# Patient Record
Sex: MALE
[De-identification: names, ages, dates, MRNs, and addresses within clinical notes are randomized per-mention and may not be internally consistent; named-entity substitution may affect disease eponyms.]

---

## 2017-08-29 ENCOUNTER — CHARTING TRANS (OUTPATIENT)
Dept: OTHER | Age: 47
End: 2017-08-29

## 2017-09-11 NOTE — LETTER
04/25/19        Collin Aguero  [facility]  [place] [address]      Dear Zay Feliz,    [facility] records indicate that you have outstanding lab work and or testing that was ordered for you and has not yet been completed:  Lab Frequency Next Occur Hpi Title: Evaluation of Skin Lesions How Severe Are Your Spot(S)?: mild Have Your Spot(S) Been Treated In The Past?: has not been treated

## 2019-03-26 ENCOUNTER — TELEPHONE (OUTPATIENT)
Dept: FAMILY MEDICINE CLINIC | Facility: CLINIC | Age: 49
End: 2019-03-26

## 2019-03-26 DIAGNOSIS — Z12.11 SCREENING FOR MALIGNANT NEOPLASM OF COLON: Primary | ICD-10-CM

## 2019-04-25 ENCOUNTER — TELEPHONE (OUTPATIENT)
Dept: FAMILY MEDICINE CLINIC | Facility: CLINIC | Age: 49
End: 2019-04-25

## 2021-04-30 ENCOUNTER — OFFICE VISIT (OUTPATIENT)
Dept: FAMILY MEDICINE CLINIC | Facility: CLINIC | Age: 51
End: 2021-04-30

## 2021-04-30 ENCOUNTER — TELEPHONE (OUTPATIENT)
Dept: ORTHOPEDICS CLINIC | Facility: CLINIC | Age: 51
End: 2021-04-30

## 2021-04-30 ENCOUNTER — TELEPHONE (OUTPATIENT)
Dept: FAMILY MEDICINE CLINIC | Facility: CLINIC | Age: 51
End: 2021-04-30

## 2021-04-30 ENCOUNTER — HOSPITAL ENCOUNTER (OUTPATIENT)
Dept: GENERAL RADIOLOGY | Age: 51
Discharge: HOME | End: 2021-04-30
Attending: FAMILY MEDICINE
Payer: OTHER MISCELLANEOUS

## 2021-04-30 ENCOUNTER — HOSPITAL ENCOUNTER (OUTPATIENT)
Age: 51
Discharge: HOME | End: 2021-04-30
Payer: OTHER MISCELLANEOUS

## 2021-04-30 VITALS
SYSTOLIC BLOOD PRESSURE: 140 MMHG | HEART RATE: 66 BPM | DIASTOLIC BLOOD PRESSURE: 100 MMHG | OXYGEN SATURATION: 99 % | TEMPERATURE: 99 F | RESPIRATION RATE: 16 BRPM

## 2021-04-30 VITALS
RESPIRATION RATE: 16 BRPM | WEIGHT: 210 LBS | DIASTOLIC BLOOD PRESSURE: 82 MMHG | SYSTOLIC BLOOD PRESSURE: 132 MMHG | BODY MASS INDEX: 30.75 KG/M2 | OXYGEN SATURATION: 99 % | TEMPERATURE: 98 F | HEART RATE: 77 BPM | HEIGHT: 69.25 IN

## 2021-04-30 DIAGNOSIS — S99.911A INJURY OF RIGHT ANKLE, INITIAL ENCOUNTER: ICD-10-CM

## 2021-04-30 DIAGNOSIS — M25.471 ANKLE SWELLING, RIGHT: ICD-10-CM

## 2021-04-30 DIAGNOSIS — S82.301A CLOSED FRACTURE OF DISTAL END OF RIGHT TIBIA, UNSPECIFIED FRACTURE MORPHOLOGY, INITIAL ENCOUNTER: Primary | ICD-10-CM
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
## 2021-04-30 DIAGNOSIS — S82.391A OTHER CLOSED FRACTURE OF DISTAL END OF RIGHT TIBIA, INITIAL ENCOUNTER: Primary | ICD-10-CM

## 2021-04-30 PROCEDURE — 3077F SYST BP >= 140 MM HG: CPT | Performed by: NURSE PRACTITIONER

## 2021-04-30 PROCEDURE — 3008F BODY MASS INDEX DOCD: CPT | Performed by: FAMILY MEDICINE

## 2021-04-30 PROCEDURE — 73610 X-RAY EXAM OF ANKLE: CPT | Performed by: FAMILY MEDICINE

## 2021-04-30 PROCEDURE — 3079F DIAST BP 80-89 MM HG: CPT | Performed by: FAMILY MEDICINE

## 2021-04-30 PROCEDURE — 29515 APPLICATION SHORT LEG SPLINT: CPT | Performed by: NURSE PRACTITIONER

## 2021-04-30 PROCEDURE — 99203 OFFICE O/P NEW LOW 30 MIN: CPT | Performed by: NURSE PRACTITIONER

## 2021-04-30 PROCEDURE — 99214 OFFICE O/P EST MOD 30 MIN: CPT | Performed by: FAMILY MEDICINE

## 2021-04-30 PROCEDURE — 3080F DIAST BP >= 90 MM HG: CPT | Performed by: NURSE PRACTITIONER

## 2021-04-30 PROCEDURE — 3075F SYST BP GE 130 - 139MM HG: CPT | Performed by: FAMILY MEDICINE

## 2021-04-30 NOTE — ED PROVIDER NOTES
Patient Seen in: Immediate [address] [place] [address]      History   Patient presents with:  Leg or Foot Injury    Stated Complaint: RIGHT ANKLE INJURY/WORKERS COMP    HPI/Subjective:   57-year-old male presents to the IC from primary care physician's office to get a s lesions  HEENT: atraumatic, normocephalic,ears and throat are clear  NECK: supple,no adenopathy,no bruits  LUNGS: clear to auscultation  CARDIO: RRR without murmur    EXTREMITIES:   R  ankle exam:   - defect/deformity : no   - swelling/effusion: yes   - te also given written discharge instructions including information regarding today's visit and indications prompting immediate return and appropriate follow-up was given in writing.   Patient and/or family agrees to return for any concerns, problems or complic

## 2021-04-30 NOTE — TELEPHONE ENCOUNTER
After confirming with ortho, regarding provider availability to see Mr Alia Cortes for his tibial plafond fracture, please call patient / spouse and confirm appt for May 10 with Dr Blake Pang. ~ MM

## 2021-04-30 NOTE — TELEPHONE ENCOUNTER
Spouse called, Pt referred to Dr. Hermelinda Polo today by Dr. Shreya Morales and Dr. Hermelinda Polo will not see pt as he does not specialize in what pt needs and  cannot see pt until 5/10. Is that too log for pt to wait and be seen?   Please call spouse at [phone]

## 2021-04-30 NOTE — PATIENT INSTRUCTIONS
XR - results - awaiting final results at this time.       Ace wrap and ankle immobilizer for temporary immobilization   CRUTCHES PROVIDED WHILE HERE IN THE OFFICE   Will await the XR results and discuss the need for an MRI if needed to rule out the possib use acetaminophen or Ibuprofen to control the pain. Talk with your provider before taking these medicines if you have chronic liver or kidney disease. Also talk with your provider if you have had an ulcer or gastrointestinal bleeding.   General care  · Rest occur:  · A plaster splint or cast gets wet or soft or breaks  · A fiberglass splint or cast gets wet and does not dry in 24 hours  · Pain or swelling gets worse, or redness appears  · Toes or the injured area become cold, blue, numb or tingly  · You can't

## 2021-04-30 NOTE — TELEPHONE ENCOUNTER
Patient's wife, Saadia Lopez, advised of Doctor's note below. She verbalized understanding. No further questions at this time.     Future Appointments   Date Time Provider Gorge Burnette   5/10/2021  8:20 AM Mesha Andres MD [facility]

## 2021-04-30 NOTE — PROGRESS NOTES
Here for right ankle injury x this am. Patient states he was at work when he stepped in a ditch and ankle extended all the way and he heard a pop. Workmans comp claim. Pain/swelling. No bruising.

## 2021-04-30 NOTE — PROGRESS NOTES
[facility] Family [facility] Office Note  Chief Complaint:   Patient presents with: Ankle Pain: right ankle pain.       HPI:   This is a 44year old male coming in for right ankle injury x this am - 8:30 am. States that he stepped in a ditch and an appropriately   SKIN: No pallor, no erythema, no cyanosis, warm and dry  Eyes: wnl, normal conjunctiva   HEAD: Normocephalic, atraumatic  EENT: OP - wnl, moist, Nares normal  NECK: FROM, supple  LUNGS: No tachypnea   CV: No tachycardia   ABD: not distended hairline, nondisplaced fracture of the distal tibia extending to the tibial talar joint involving the medial tibial plafond.    Dictated by (CST): Geovanni Pires MD on 4/30/2021 at 1:11 PM     Finalized by (CST): Geovanni Pires MD on 4/30/2021 at 1: learning. Medical education done. Outcome: Patient verbalizes understanding. Patient is notified to call with any questions, complications, allergies, or worsening or changing symptoms.   Patient is to call with any side effects or complications from the

## 2021-04-30 NOTE — ED INITIAL ASSESSMENT (HPI)
Pt sts 8:30am today caught right foot on a string line at work. Had x/r at other [facility] location- fx seen, sent here for splint.

## 2021-04-30 NOTE — TELEPHONE ENCOUNTER
Patient has an appointment scheduled with Dr. Christian Johnson on 5/10 for right ankle tib fib. Patient has had imaging, will additional imaging be needed?

## 2021-05-03 ENCOUNTER — TELEPHONE (OUTPATIENT)
Dept: FAMILY MEDICINE CLINIC | Facility: CLINIC | Age: 51
End: 2021-05-03

## 2021-05-03 NOTE — TELEPHONE ENCOUNTER
----- Message from Shanna Horvath MD sent at 5/3/2021  9:05 AM CDT -----  Regarding: FW: Will need your expertise soon    ----- Message -----  From: Aravind Hunt MD  Sent: 5/3/2021   7:59 AM CDT  To: Sonia Ball DO, #  Subject: RE: Will ne

## 2021-05-03 NOTE — TELEPHONE ENCOUNTER
Allyson @ PCP office calling stating Dr Jolyn Osler agreed to see this pt instead of Dr Kwadwo Amaya, please advise if this appt should be rescheduled for pt to see Dr Jolyn Osler

## 2021-05-03 NOTE — TELEPHONE ENCOUNTER
Future Appointments   Date Time Provider Gorge Yomaira   5/5/2021 10:40 AM Hazel Metzger MD [facility]

## 2021-05-03 NOTE — TELEPHONE ENCOUNTER
Called Dr. Juice Berger office to confirm pt's appointment change. Staff stated no messages from Dr. Wily Bennett about pt's appointment. Stated she will double check w/ Dr. Wily Bennett and will contact patient for appointment change.     Will route to nurse pool f

## 2021-05-05 ENCOUNTER — OFFICE VISIT (OUTPATIENT)
Dept: ORTHOPEDICS CLINIC | Facility: CLINIC | Age: 51
End: 2021-05-05
Payer: OTHER MISCELLANEOUS

## 2021-05-05 ENCOUNTER — HOSPITAL ENCOUNTER (OUTPATIENT)
Dept: GENERAL RADIOLOGY | Age: 51
Discharge: HOME | End: 2021-05-05
Attending: ORTHOPAEDIC SURGERY
Payer: OTHER MISCELLANEOUS

## 2021-05-05 VITALS — HEART RATE: 75 BPM | OXYGEN SATURATION: 99 %

## 2021-05-05 DIAGNOSIS — S82.891A CLOSED FRACTURE OF RIGHT ANKLE, INITIAL ENCOUNTER: Primary | ICD-10-CM

## 2021-05-05 DIAGNOSIS — S82.891A CLOSED FRACTURE OF RIGHT ANKLE, INITIAL ENCOUNTER: ICD-10-CM

## 2021-05-05 PROCEDURE — 27760 CLTX MEDIAL ANKLE FX: CPT | Performed by: ORTHOPAEDIC SURGERY

## 2021-05-05 PROCEDURE — L4387 NON-PNEUM WALK BOOT PRE OTS: HCPCS | Performed by: ORTHOPAEDIC SURGERY

## 2021-05-05 PROCEDURE — 73590 X-RAY EXAM OF LOWER LEG: CPT | Performed by: ORTHOPAEDIC SURGERY

## 2021-05-05 PROCEDURE — 99203 OFFICE O/P NEW LOW 30 MIN: CPT | Performed by: ORTHOPAEDIC SURGERY

## 2021-05-05 NOTE — H&P
[facility] New Patient Note    CC: Patient presents with:  Fracture: right tib/fib fracture       HPI: This 44year old male presents today with complaints of right ankle injury.   Patient reports that he was walking 4 days ago, is not sure how he cam skin intact under splint, no open wounds or lesions  • Palpation: Tender to palpation near medial malleolus, nontender lateral malleolus, nontender at the fibula and proximal fibula in the leg, nontender midfoot joint, nontender over the forefoot and midfo that time. He expressed understanding and agreement with this discussion and plan.     Marlen Florian MD, [address] Orthopedic Surgery  Phone [phone]  Fax [phone]

## 2021-05-12 ENCOUNTER — HOSPITAL ENCOUNTER (OUTPATIENT)
Dept: GENERAL RADIOLOGY | Age: 51
Discharge: HOME | End: 2021-05-12
Attending: ORTHOPAEDIC SURGERY
Payer: OTHER MISCELLANEOUS

## 2021-05-12 DIAGNOSIS — S82.891A CLOSED FRACTURE OF RIGHT ANKLE, INITIAL ENCOUNTER: ICD-10-CM

## 2021-05-12 PROCEDURE — 73610 X-RAY EXAM OF ANKLE: CPT | Performed by: ORTHOPAEDIC SURGERY

## 2021-06-02 ENCOUNTER — HOSPITAL ENCOUNTER (OUTPATIENT)
Dept: GENERAL RADIOLOGY | Age: 51
Discharge: HOME | End: 2021-06-02
Attending: ORTHOPAEDIC SURGERY
Payer: OTHER MISCELLANEOUS

## 2021-06-02 ENCOUNTER — OFFICE VISIT (OUTPATIENT)
Dept: ORTHOPEDICS CLINIC | Facility: CLINIC | Age: 51
End: 2021-06-02
Payer: COMMERCIAL

## 2021-06-02 VITALS — HEART RATE: 73 BPM | OXYGEN SATURATION: 99 %

## 2021-06-02 DIAGNOSIS — S82.891D CLOSED FRACTURE OF RIGHT ANKLE WITH ROUTINE HEALING, SUBSEQUENT ENCOUNTER: Primary | ICD-10-CM

## 2021-06-02 DIAGNOSIS — S82.891A CLOSED FRACTURE OF RIGHT ANKLE, INITIAL ENCOUNTER: ICD-10-CM

## 2021-06-02 PROCEDURE — 73610 X-RAY EXAM OF ANKLE: CPT | Performed by: ORTHOPAEDIC SURGERY

## 2021-06-02 PROCEDURE — 99024 POSTOP FOLLOW-UP VISIT: CPT | Performed by: ORTHOPAEDIC SURGERY

## 2021-06-17 ENCOUNTER — TELEPHONE (OUTPATIENT)
Dept: ORTHOPEDICS CLINIC | Facility: CLINIC | Age: 51
End: 2021-06-17

## 2021-06-17 NOTE — TELEPHONE ENCOUNTER
Left detailed voicemail informing patient of letter completion. Advised patient to call us back if he would like letter faxed or  in office.

## 2021-06-17 NOTE — TELEPHONE ENCOUNTER
Letter pended for approval   Date to return to work 6/21/21.  Patient would like a note to return to work stating that patient can only walk and work on flat surfaces  Future Appointments   Date Time Provider Gorge Burnette   7/2/2021  8:00 AM Jeremias Jones,

## 2021-07-02 ENCOUNTER — HOSPITAL ENCOUNTER (OUTPATIENT)
Dept: GENERAL RADIOLOGY | Age: 51
Discharge: HOME | End: 2021-07-02
Attending: ORTHOPAEDIC SURGERY
Payer: OTHER MISCELLANEOUS

## 2021-07-02 ENCOUNTER — OFFICE VISIT (OUTPATIENT)
Dept: ORTHOPEDICS CLINIC | Facility: CLINIC | Age: 51
End: 2021-07-02
Payer: OTHER MISCELLANEOUS

## 2021-07-02 VITALS — HEART RATE: 65 BPM | OXYGEN SATURATION: 99 %

## 2021-07-02 DIAGNOSIS — S82.891D CLOSED FRACTURE OF RIGHT ANKLE WITH ROUTINE HEALING, SUBSEQUENT ENCOUNTER: ICD-10-CM

## 2021-07-02 DIAGNOSIS — S82.891D CLOSED FRACTURE OF RIGHT ANKLE WITH ROUTINE HEALING, SUBSEQUENT ENCOUNTER: Primary | ICD-10-CM

## 2021-07-02 PROCEDURE — 73610 X-RAY EXAM OF ANKLE: CPT | Performed by: ORTHOPAEDIC SURGERY

## 2021-07-02 PROCEDURE — 99024 POSTOP FOLLOW-UP VISIT: CPT | Performed by: ORTHOPAEDIC SURGERY

## 2021-07-02 NOTE — PROGRESS NOTES
[facility] Progress Note    Subjective: Patient returns 2 months after sustaining a right ankle medial malleolus fracture. He has been out of the walking boot since last visit.   He states that he has some soreness on the inside of his ankle like a b and a half for repeat evaluation, no x-rays needed.     Colleen Rudd MD, [address] [facility]  Phone [phone]  Fax [phone]

## 2021-08-09 ENCOUNTER — PATIENT MESSAGE (OUTPATIENT)
Dept: ORTHOPEDICS CLINIC | Facility: CLINIC | Age: 51
End: 2021-08-09

## 2021-08-09 NOTE — TELEPHONE ENCOUNTER
From: Cathryn Pelayo  To: Dewayne Mosley MD  Sent: 8/9/2021 10:57 AM CDT  Subject: Visit Follow-up Question    I am requesting a full back to work letter for my file and for my employer. My last letter for back to work had restrictions.  Can I have

## 2022-05-03 ENCOUNTER — HOSPITAL ENCOUNTER (OUTPATIENT)
Dept: CT IMAGING | Age: 52
Discharge: HOME | End: 2022-05-03
Attending: FAMILY MEDICINE

## 2022-05-03 DIAGNOSIS — Z13.9 SPECIAL SCREENING: ICD-10-CM

## 2022-05-06 ENCOUNTER — TELEPHONE (OUTPATIENT)
Dept: FAMILY MEDICINE CLINIC | Facility: CLINIC | Age: 52
End: 2022-05-06

## 2022-05-06 NOTE — TELEPHONE ENCOUNTER
----- Message from Gautam Sanchez DO sent at 5/6/2022  6:28 AM CDT -----  Please notify MrKole Dodson that his heart scan score came back positive for calcification. , it wasn;t a high score but the calcifications were in the arteries that feed the left side of the heart. It looks like its been a LONG time since rm seen him? I'd like him to come in and get caught up on his health amintenance, labs etc, and talk about treating his results.  He should be taking a baby ASA 81 mg a day to start and we can talk about statin therapy

## 2022-05-11 ENCOUNTER — OFFICE VISIT (OUTPATIENT)
Dept: FAMILY MEDICINE CLINIC | Facility: CLINIC | Age: 52
End: 2022-05-11
Payer: COMMERCIAL

## 2022-05-11 VITALS
RESPIRATION RATE: 22 BRPM | BODY MASS INDEX: 32.11 KG/M2 | WEIGHT: 216.81 LBS | SYSTOLIC BLOOD PRESSURE: 134 MMHG | HEIGHT: 69 IN | DIASTOLIC BLOOD PRESSURE: 82 MMHG | HEART RATE: 92 BPM | TEMPERATURE: 99 F

## 2022-05-11 DIAGNOSIS — I77.810 DILATED AORTIC ROOT (HCC): ICD-10-CM

## 2022-05-11 DIAGNOSIS — Z00.00 ROUTINE HEALTH MAINTENANCE: Primary | ICD-10-CM

## 2022-05-11 DIAGNOSIS — I25.10 CORONARY ARTERY CALCIFICATION: ICD-10-CM

## 2022-05-11 DIAGNOSIS — Z82.49 FAMILY HISTORY OF CORONARY ARTERY DISEASE IN GRANDFATHER: ICD-10-CM

## 2022-05-11 DIAGNOSIS — I25.84 CORONARY ARTERY CALCIFICATION: ICD-10-CM

## 2022-05-11 PROCEDURE — 3079F DIAST BP 80-89 MM HG: CPT | Performed by: FAMILY MEDICINE

## 2022-05-11 PROCEDURE — 3075F SYST BP GE 130 - 139MM HG: CPT | Performed by: FAMILY MEDICINE

## 2022-05-11 PROCEDURE — 3008F BODY MASS INDEX DOCD: CPT | Performed by: FAMILY MEDICINE

## 2022-05-11 PROCEDURE — 99215 OFFICE O/P EST HI 40 MIN: CPT | Performed by: FAMILY MEDICINE

## 2022-05-11 NOTE — PROGRESS NOTES
RN had pt sign paperwork for Cologuard in office    Pt wife advised she had previously done cologuard with same insurance plan and it was covered.

## 2022-05-14 ENCOUNTER — NURSE ONLY (OUTPATIENT)
Dept: FAMILY MEDICINE CLINIC | Facility: CLINIC | Age: 52
End: 2022-05-14
Payer: COMMERCIAL

## 2022-05-14 DIAGNOSIS — Z00.00 ROUTINE HEALTH MAINTENANCE: ICD-10-CM

## 2022-05-14 LAB
ALBUMIN SERPL-MCNC: 4 G/DL (ref 3.4–5)
ALBUMIN/GLOB SERPL: 1.3 {RATIO} (ref 1–2)
ALP LIVER SERPL-CCNC: 54 U/L
ALT SERPL-CCNC: 26 U/L
ANION GAP SERPL CALC-SCNC: 3 MMOL/L (ref 0–18)
AST SERPL-CCNC: 21 U/L (ref 15–37)
BASOPHILS # BLD AUTO: 0.02 X10(3) UL (ref 0–0.2)
BASOPHILS NFR BLD AUTO: 0.4 %
BILIRUB SERPL-MCNC: 0.9 MG/DL (ref 0.1–2)
BUN BLD-MCNC: 22 MG/DL (ref 7–18)
CALCIUM BLD-MCNC: 8.9 MG/DL (ref 8.5–10.1)
CHLORIDE SERPL-SCNC: 106 MMOL/L (ref 98–112)
CHOLEST SERPL-MCNC: 196 MG/DL (ref ?–200)
CO2 SERPL-SCNC: 28 MMOL/L (ref 21–32)
COMPLEXED PSA SERPL-MCNC: 1.78 NG/ML (ref ?–4)
CREAT BLD-MCNC: 1.1 MG/DL
EOSINOPHIL # BLD AUTO: 0.06 X10(3) UL (ref 0–0.7)
EOSINOPHIL NFR BLD AUTO: 1.3 %
ERYTHROCYTE [DISTWIDTH] IN BLOOD BY AUTOMATED COUNT: 12.6 %
FASTING PATIENT LIPID ANSWER: YES
FASTING STATUS PATIENT QL REPORTED: YES
GLOBULIN PLAS-MCNC: 3.1 G/DL (ref 2.8–4.4)
GLUCOSE BLD-MCNC: 90 MG/DL (ref 70–99)
HCT VFR BLD AUTO: 42.5 %
HDLC SERPL-MCNC: 56 MG/DL (ref 40–59)
HGB BLD-MCNC: 14.6 G/DL
IMM GRANULOCYTES # BLD AUTO: 0.01 X10(3) UL (ref 0–1)
IMM GRANULOCYTES NFR BLD: 0.2 %
LDLC SERPL CALC-MCNC: 131 MG/DL (ref ?–100)
LYMPHOCYTES # BLD AUTO: 1.37 X10(3) UL (ref 1–4)
LYMPHOCYTES NFR BLD AUTO: 30.4 %
MCH RBC QN AUTO: 31.8 PG (ref 26–34)
MCHC RBC AUTO-ENTMCNC: 34.4 G/DL (ref 31–37)
MCV RBC AUTO: 92.6 FL
MONOCYTES # BLD AUTO: 0.46 X10(3) UL (ref 0.1–1)
MONOCYTES NFR BLD AUTO: 10.2 %
NEUTROPHILS # BLD AUTO: 2.58 X10 (3) UL (ref 1.5–7.7)
NEUTROPHILS # BLD AUTO: 2.58 X10(3) UL (ref 1.5–7.7)
NEUTROPHILS NFR BLD AUTO: 57.5 %
NONHDLC SERPL-MCNC: 140 MG/DL (ref ?–130)
OSMOLALITY SERPL CALC.SUM OF ELEC: 287 MOSM/KG (ref 275–295)
PLATELET # BLD AUTO: 246 10(3)UL (ref 150–450)
POTASSIUM SERPL-SCNC: 4.4 MMOL/L (ref 3.5–5.1)
PROT SERPL-MCNC: 7.1 G/DL (ref 6.4–8.2)
RBC # BLD AUTO: 4.59 X10(6)UL
SODIUM SERPL-SCNC: 137 MMOL/L (ref 136–145)
TRIGL SERPL-MCNC: 50 MG/DL (ref 30–149)
TSI SER-ACNC: 1.3 MIU/ML (ref 0.36–3.74)
VLDLC SERPL CALC-MCNC: 9 MG/DL (ref 0–30)
WBC # BLD AUTO: 4.5 X10(3) UL (ref 4–11)

## 2022-05-14 PROCEDURE — 84153 ASSAY OF PSA TOTAL: CPT | Performed by: FAMILY MEDICINE

## 2022-05-14 PROCEDURE — 80050 GENERAL HEALTH PANEL: CPT | Performed by: FAMILY MEDICINE

## 2022-05-14 PROCEDURE — 80061 LIPID PANEL: CPT | Performed by: FAMILY MEDICINE

## 2022-05-14 NOTE — PROGRESS NOTES
Pt was in office for labs per DS    1 gold and 1 lavender tube collected from [facility] using straight needle and 1 attempt    Pt tolerated and was sent home in stable condition    RN also did eduction with wife in proper technique to check BP at home    In office lesson included teach back- all questions were answered.

## 2022-05-17 ENCOUNTER — TELEPHONE (OUTPATIENT)
Dept: FAMILY MEDICINE CLINIC | Facility: CLINIC | Age: 52
End: 2022-05-17

## 2022-05-17 DIAGNOSIS — I77.810 DILATED AORTIC ROOT (HCC): ICD-10-CM

## 2022-05-17 DIAGNOSIS — I25.10 CORONARY ARTERY CALCIFICATION: Primary | ICD-10-CM

## 2022-05-17 DIAGNOSIS — I25.84 CORONARY ARTERY CALCIFICATION: Primary | ICD-10-CM

## 2022-05-17 NOTE — TELEPHONE ENCOUNTER
----- Message from Quoc Sapp DO sent at 5/17/2022  2:48 PM CDT -----  Notify David Rosenthal  that overall his  labs looked very good, he had Excellent kidney, liver function, blood sugar and thyroid function, his prostate test came back normal as well. His cholesterol looked good, but with recent finding of coronary calcification and possibly HTN, his gaol is now 170 for total (his was 196), and 70 or less for his LDL, bad cholesterol. We discussed statin therapy which he does not want to do. And we need to follow up on his BP to see if he needs something for that. Finally I placed the order for the CTA for his dilated aorta.  Lets recall LIPIDS in 6 months to recheck

## 2022-05-18 NOTE — TELEPHONE ENCOUNTER
Pt was advised of information below- verbalized understanding    Pt asked that we call wife and explain information to her since she does all the scheduling for him      RN spoke with wife and gave information below and number to CS to have this done.     Verbalized understanding

## 2022-06-01 ENCOUNTER — TELEPHONE (OUTPATIENT)
Dept: FAMILY MEDICINE CLINIC | Facility: CLINIC | Age: 52
End: 2022-06-01

## 2022-06-01 NOTE — TELEPHONE ENCOUNTER
I don;t know what to tell them, we've never had anyone complain about the process even if they had to repeat it? His other option is to have a colonoscopy done, I can give him the name of a gastroenterologist if he'd like, the last option is to do stool hemoccult  cards but that means transporting and dropping them off here.  But if they are positive then he will still need a colonoscopy

## 2022-06-01 NOTE — TELEPHONE ENCOUNTER
[facility] and wife has a question about one of the results. Can you please call. Thank you!

## 2022-06-01 NOTE — TELEPHONE ENCOUNTER
Wife states that she has not seen the results of cologuard testing    Rn states that we received information today that the sample could not be processed    RN explained to wife that we have no other information on why it could not be processed    Wife states that pt was unhappy about the entire process. He did not like that the box was not discrete and that his neighbors knew he was doing cologuard    Wife states she does not think he will do it again. Is there any other option besides a colonoscopy?

## 2022-06-15 NOTE — IMAGING NOTE
Call placed to pt at this time. Pt advised to arrive at 1345. Pt advised to take HR control meds as ordered by their ordering physician 1 hour prior to arrival.  Pt may eat a light meal prior to scan and to drink plenty of extra water the day before. Pt advised to refrain from coffees and teas including those labeled as decaf and pop as well as chocolate for 12 hours prior to scan.   Pt advised to take regular medications as usual.

## 2022-06-17 ENCOUNTER — HOSPITAL ENCOUNTER (OUTPATIENT)
Dept: CT IMAGING | Facility: HOSPITAL | Age: 52
Discharge: HOME | End: 2022-06-17
Attending: FAMILY MEDICINE
Payer: COMMERCIAL

## 2022-06-17 VITALS — HEART RATE: 74 BPM | DIASTOLIC BLOOD PRESSURE: 99 MMHG | SYSTOLIC BLOOD PRESSURE: 150 MMHG

## 2022-06-17 DIAGNOSIS — I77.810 DILATED AORTIC ROOT (HCC): ICD-10-CM

## 2022-06-17 LAB — CREAT BLD-MCNC: 1.1 MG/DL

## 2022-06-17 PROCEDURE — 82565 ASSAY OF CREATININE: CPT

## 2022-06-17 PROCEDURE — 71275 CT ANGIOGRAPHY CHEST: CPT | Performed by: FAMILY MEDICINE

## 2022-06-17 NOTE — IMAGING NOTE
Received pt to CT 3,[facility] at 1415. Pt verified name, , and allergies. Contrast= 110 ml  Saline= 100 ml  Average HR= 73  Pt tolerated exam well. Exam finished at 1432. Pt denies dizziness with change of position, escorted to dressing room with steady gait.

## 2022-06-21 ENCOUNTER — TELEPHONE (OUTPATIENT)
Dept: FAMILY MEDICINE CLINIC | Facility: CLINIC | Age: 52
End: 2022-06-21

## 2022-06-21 NOTE — TELEPHONE ENCOUNTER
SPOUSE CALLED AND ADV THAT PT HAD CT DONE. WOULD LIKE DR TO GO OVER RESULTS AND EXPLAIN TO PT THE OUTCOME.     PLEASE ADV      THANK YOU

## 2022-06-21 NOTE — TELEPHONE ENCOUNTER
RN spoke with with and advised that at this time we only have half of the results- we are waiting on the results from cadriology    RN and wife decided that it would be best to call pt to discuss results when we have all the results and can discuss the whole picture    Wife thanked our office for how kind and great we are in taking care of her family.     RN to contact cardiology to see when the vascular portion will be read

## 2022-06-21 NOTE — TELEPHONE ENCOUNTER
RN called CT X [id number]    RN asked CT who will be reading the CTA from 6/17/22    They are going to contact Cardiologist and remind them to have this read

## 2022-06-21 NOTE — TELEPHONE ENCOUNTER
well the general overview shows that the lungs looked fine, he has some early arthritic changes of his spine. But the cardiologist has not read the Vascular part of the study yet?

## 2022-06-25 ENCOUNTER — TELEPHONE (OUTPATIENT)
Dept: FAMILY MEDICINE CLINIC | Facility: CLINIC | Age: 52
End: 2022-06-25

## 2022-06-25 NOTE — TELEPHONE ENCOUNTER
----- Message from Scot Juares DO sent at 6/25/2022 11:59 AM CDT -----  Spoke with patient, about his CTA results, he has a mildly dilate aorta and needs a follow up in about a year. He had a UFHS that showed a score of 124, he should be on a statin, but declines at this time. He also has a HX of elevated BP, not sure if this is due to [facility] , has a family HX of HTN , so good BP control systolic under 103 and diastolic in the 24'G , he is going to check his BP at home and then brig his machine here to check against ours, I also recommend he start taking a baby asa 81 daily. Recheck CTA In 1 year.  And RTC in 2 weeks with BP machine for NV, (please recall CTA for 1 year)

## 2022-06-25 NOTE — TELEPHONE ENCOUNTER
Spoke with patient regarding his CTA results and also about his displeasure with his experience here. His first concern was about how his BP was taken, the first person apparently had to untangle the BP cuffs which were velcroed together in a bin on the wall. Making him wonder how often they are used? I explained that that's where they are kept. The second person applied the cuff but did not tighten it and used her finger to check what sounded like his pulse, while looking at her watch. He was upset with the Cologuard process because he felt like his patient privacy was violated when he had to put the box marked Cologuard on his front porch for [employer] to  and everyone knew what he was doing. Then the sample he sent was inadequate and had to be redone. He felt that if we hired a company to do these they should use the same standard that THE [facility] uses for privacy. I explained to him we did no hire them, they are an  and I agreed maybe there is a better way to label the  Package.

## 2024-04-25 ENCOUNTER — APPOINTMENT (OUTPATIENT)
Dept: DERMATOLOGY | Age: 54
End: 2024-04-25

## (undated) NOTE — LETTER
Date: 6/17/2021    Patient Name: Ankit Kenyon          To Whom it may concern:     This letter has been written at the patient's request. The above patient was seen at one of the [facility] for treatment of a medical con

## (undated) NOTE — LETTER
Date: 8/9/2021    Patient Name: Crystal Phan          To Whom it may concern:     This letter has been written at the patient's request. The above patient was seen at one of the [facility] for treatment of a medical cond

## (undated) NOTE — LETTER
07/02/21    Date:     Patient Name: Hoang Mckeon        To Whom it may concern: This letter has been written at the patient's request. The above patient was seen at the [facility] for treatment of a medical condition.     The pat